# Patient Record
Sex: FEMALE
[De-identification: names, ages, dates, MRNs, and addresses within clinical notes are randomized per-mention and may not be internally consistent; named-entity substitution may affect disease eponyms.]

---

## 2020-02-22 ENCOUNTER — NURSE TRIAGE (OUTPATIENT)
Dept: OTHER | Facility: CLINIC | Age: 65
End: 2020-02-22

## 2020-02-22 NOTE — TELEPHONE ENCOUNTER
Reason for Disposition   [1] MODERATE difficulty breathing (e.g., speaks in phrases, SOB even at rest, pulse 100-120) AND [2] NEW-onset or WORSE than normal    Answer Assessment - Initial Assessment Questions  1. RESPIRATORY STATUS: \"Describe your breathing? \" (e.g., wheezing, shortness of breath, unable to speak, severe coughing)       While taking deep breaths it hurts  2. ONSET: \"When did this breathing problem begin? \"       Today  3. PATTERN \"Does the difficult breathing come and go, or has it been constant since it started? \"       Constant since this afternoon  4. SEVERITY: \"How bad is your breathing? \" (e.g., mild, moderate, severe)     - MILD: No SOB at rest, mild SOB with walking, speaks normally in sentences, can lay down, no retractions, pulse < 100.     - MODERATE: SOB at rest, SOB with minimal exertion and prefers to sit, cannot lie down flat, speaks in phrases, mild retractions, audible wheezing, pulse 100-120.     - SEVERE: Very SOB at rest, speaks in single words, struggling to breathe, sitting hunched forward, retractions, pulse > 120       Moderate  5. RECURRENT SYMPTOM: \"Have you had difficulty breathing before? \" If so, ask: \"When was the last time? \" and \"What happened that time? \"       Just with this illness  6. CARDIAC HISTORY: \"Do you have any history of heart disease? \" (e.g., heart attack, angina, bypass surgery, angioplasty)       NO  7. LUNG HISTORY: \"Do you have any history of lung disease? \"  (e.g., pulmonary embolus, asthma, emphysema)      NO  8. CAUSE: \"What do you think is causing the breathing problem? \"       Has been ill sine Tuesday evening  9. OTHER SYMPTOMS: \"Do you have any other symptoms? (e.g., dizziness, runny nose, cough, chest pain, fever)      Cough mucus is yellow and green, runny nose is clear to light yellow. No fever. NO cp, just tightnesss  10. PREGNANCY: \"Is there any chance you are pregnant? \" \"When was your last menstrual period? \"        NO  11.  TRAVEL: \"Have you traveled out of the country in the last month? \" (e.g., travel history, exposures)        No    Protocols used: BREATHING DIFFICULTY-ADULT-AH    Patient was diagnosed with bronchitis on Wednesday and is on an antibiotics, kShe states her breathing is worsening, she is wheezing and SOB at rest. Recommendation and care advice discussed with patient. Please do not respond to the triage nurse through this encounter. Any subsequent communication should be directly with the patient.